# Patient Record
(demographics unavailable — no encounter records)

---

## 2025-05-01 NOTE — ASSESSMENT
[FreeTextEntry1] : 67M with hx of HTN, obesity, current smoker, who comes to the office referred by PMD for evaluation of an abnormal TTE obtained for evaluation of HTN.  Patient reports chronic shortness of breath that he attributes to his obesity.  Patient denies chest pain, palpitations, PND, orthopnea, leg edema, claudication, no syncope.   #Abnormal ECHO/ Shortness of breath  CCTA to rule out obstructive CAD TTE to corroborate results jardiance ordered  #Smoker  AAA screening and MARCE[facility] on next visit  Counseled patient on smoking cessation.  #Obesity  Diet and lifestyle modification discussed including low sodium, low fat and low carbohydrate weight reducing diet. Patient is to implement aerobic exercise regimen few days per week. Continue with current medical therapy.  RTC post testing  I appreciate the opportunity of working with you in the care of MIKE MORENO . If I may be of additional assistance, please do not hesitate to contact me.

## 2025-05-01 NOTE — DISCUSSION/SUMMARY
[EKG obtained to assist in diagnosis and management of assessed problem(s)] : EKG obtained to assist in diagnosis and management of assessed problem(s)
[phone]

## 2025-05-01 NOTE — HISTORY OF PRESENT ILLNESS
[FreeTextEntry1] : 67M with hx of HTN, obesity, current smoker, who comes to the office referred by PMD for evaluation of an abnormal TTE obtained for evaluation of HTN.  Patient reports chronic shortness of breath that he attributes to his obesity.  Patient denies chest pain, palpitations, PND, orthopnea, leg edema, claudication, no syncope.

## 2025-05-01 NOTE — REASON FOR VISIT
[Cardiac Failure] : cardiac failure [CV Risk Factors and Non-Cardiac Disease] : CV risk factors and non-cardiac disease